# Patient Record
Sex: MALE | Race: WHITE | Employment: FULL TIME | URBAN - METROPOLITAN AREA
[De-identification: names, ages, dates, MRNs, and addresses within clinical notes are randomized per-mention and may not be internally consistent; named-entity substitution may affect disease eponyms.]

---

## 2019-01-12 ENCOUNTER — APPOINTMENT (OUTPATIENT)
Dept: GENERAL RADIOLOGY | Age: 69
DRG: 292 | End: 2019-01-12
Attending: STUDENT IN AN ORGANIZED HEALTH CARE EDUCATION/TRAINING PROGRAM
Payer: COMMERCIAL

## 2019-01-12 ENCOUNTER — HOSPITAL ENCOUNTER (INPATIENT)
Age: 69
LOS: 2 days | Discharge: HOME | DRG: 292 | End: 2019-01-14
Attending: STUDENT IN AN ORGANIZED HEALTH CARE EDUCATION/TRAINING PROGRAM | Admitting: HOSPITALIST
Payer: COMMERCIAL

## 2019-01-12 DIAGNOSIS — I48.91 ATRIAL FIBRILLATION WITH SLOW VENTRICULAR RESPONSE (HCC): Primary | ICD-10-CM

## 2019-01-12 DIAGNOSIS — I50.9 ACUTE ON CHRONIC CONGESTIVE HEART FAILURE, UNSPECIFIED HEART FAILURE TYPE (HCC): ICD-10-CM

## 2019-01-12 LAB
ALBUMIN SERPL-MCNC: 3.5 G/DL (ref 3.5–5)
ALBUMIN/GLOB SERPL: 0.9 {RATIO} (ref 1.1–2.2)
ALP SERPL-CCNC: 62 U/L (ref 45–117)
ALT SERPL-CCNC: 30 U/L (ref 12–78)
ANION GAP SERPL CALC-SCNC: 8 MMOL/L (ref 5–15)
AST SERPL-CCNC: 23 U/L (ref 15–37)
BASOPHILS # BLD: 0 K/UL (ref 0–0.1)
BASOPHILS NFR BLD: 1 % (ref 0–1)
BILIRUB SERPL-MCNC: 0.7 MG/DL (ref 0.2–1)
BNP SERPL-MCNC: 1189 PG/ML (ref 0–125)
BUN SERPL-MCNC: 23 MG/DL (ref 6–20)
BUN/CREAT SERPL: 18 (ref 12–20)
CALCIUM SERPL-MCNC: 8.5 MG/DL (ref 8.5–10.1)
CHLORIDE SERPL-SCNC: 99 MMOL/L (ref 97–108)
CO2 SERPL-SCNC: 25 MMOL/L (ref 21–32)
COMMENT, HOLDF: NORMAL
CREAT SERPL-MCNC: 1.26 MG/DL (ref 0.7–1.3)
DIFFERENTIAL METHOD BLD: ABNORMAL
EOSINOPHIL # BLD: 0.1 K/UL (ref 0–0.4)
EOSINOPHIL NFR BLD: 4 % (ref 0–7)
ERYTHROCYTE [DISTWIDTH] IN BLOOD BY AUTOMATED COUNT: 13.8 % (ref 11.5–14.5)
GLOBULIN SER CALC-MCNC: 4.1 G/DL (ref 2–4)
GLUCOSE SERPL-MCNC: 100 MG/DL (ref 65–100)
HCT VFR BLD AUTO: 34.4 % (ref 36.6–50.3)
HGB BLD-MCNC: 11.9 G/DL (ref 12.1–17)
IMM GRANULOCYTES # BLD AUTO: 0 K/UL (ref 0–0.04)
IMM GRANULOCYTES NFR BLD AUTO: 1 % (ref 0–0.5)
LYMPHOCYTES # BLD: 0.8 K/UL (ref 0.8–3.5)
LYMPHOCYTES NFR BLD: 38 % (ref 12–49)
MAGNESIUM SERPL-MCNC: 1.9 MG/DL (ref 1.6–2.4)
MCH RBC QN AUTO: 34.4 PG (ref 26–34)
MCHC RBC AUTO-ENTMCNC: 34.6 G/DL (ref 30–36.5)
MCV RBC AUTO: 99.4 FL (ref 80–99)
MONOCYTES # BLD: 0.5 K/UL (ref 0–1)
MONOCYTES NFR BLD: 23 % (ref 5–13)
NEUTS SEG # BLD: 0.7 K/UL (ref 1.8–8)
NEUTS SEG NFR BLD: 33 % (ref 32–75)
NRBC # BLD: 0 K/UL (ref 0–0.01)
NRBC BLD-RTO: 0 PER 100 WBC
PATH REV BLD -IMP: ABNORMAL
PLATELET # BLD AUTO: 95 K/UL (ref 150–400)
PMV BLD AUTO: 9.1 FL (ref 8.9–12.9)
POTASSIUM SERPL-SCNC: 3.8 MMOL/L (ref 3.5–5.1)
PROT SERPL-MCNC: 7.6 G/DL (ref 6.4–8.2)
RBC # BLD AUTO: 3.46 M/UL (ref 4.1–5.7)
RBC MORPH BLD: ABNORMAL
SAMPLES BEING HELD,HOLD: NORMAL
SODIUM SERPL-SCNC: 132 MMOL/L (ref 136–145)
TROPONIN I SERPL-MCNC: <0.05 NG/ML
WBC # BLD AUTO: 2.1 K/UL (ref 4.1–11.1)

## 2019-01-12 PROCEDURE — 80053 COMPREHEN METABOLIC PANEL: CPT

## 2019-01-12 PROCEDURE — 83880 ASSAY OF NATRIURETIC PEPTIDE: CPT

## 2019-01-12 PROCEDURE — 99285 EMERGENCY DEPT VISIT HI MDM: CPT

## 2019-01-12 PROCEDURE — 96374 THER/PROPH/DIAG INJ IV PUSH: CPT

## 2019-01-12 PROCEDURE — 36415 COLL VENOUS BLD VENIPUNCTURE: CPT

## 2019-01-12 PROCEDURE — 74011250636 HC RX REV CODE- 250/636: Performed by: STUDENT IN AN ORGANIZED HEALTH CARE EDUCATION/TRAINING PROGRAM

## 2019-01-12 PROCEDURE — 84484 ASSAY OF TROPONIN QUANT: CPT

## 2019-01-12 PROCEDURE — 83735 ASSAY OF MAGNESIUM: CPT

## 2019-01-12 PROCEDURE — 74011250637 HC RX REV CODE- 250/637: Performed by: STUDENT IN AN ORGANIZED HEALTH CARE EDUCATION/TRAINING PROGRAM

## 2019-01-12 PROCEDURE — 93005 ELECTROCARDIOGRAM TRACING: CPT

## 2019-01-12 PROCEDURE — 85025 COMPLETE CBC W/AUTO DIFF WBC: CPT

## 2019-01-12 PROCEDURE — 65660000000 HC RM CCU STEPDOWN

## 2019-01-12 PROCEDURE — 71045 X-RAY EXAM CHEST 1 VIEW: CPT

## 2019-01-12 RX ORDER — POTASSIUM CHLORIDE 750 MG/1
40 TABLET, FILM COATED, EXTENDED RELEASE ORAL
Status: COMPLETED | OUTPATIENT
Start: 2019-01-12 | End: 2019-01-12

## 2019-01-12 RX ORDER — FUROSEMIDE 10 MG/ML
80 INJECTION INTRAMUSCULAR; INTRAVENOUS
Status: COMPLETED | OUTPATIENT
Start: 2019-01-12 | End: 2019-01-12

## 2019-01-12 RX ORDER — AMIODARONE HYDROCHLORIDE 200 MG/1
200 TABLET ORAL DAILY
COMMUNITY
End: 2019-01-14

## 2019-01-12 RX ORDER — LOSARTAN POTASSIUM 100 MG/1
100 TABLET ORAL DAILY
COMMUNITY

## 2019-01-12 RX ORDER — AMLODIPINE BESYLATE 5 MG/1
5 TABLET ORAL DAILY
Status: DISCONTINUED | OUTPATIENT
Start: 2019-01-13 | End: 2019-01-14 | Stop reason: HOSPADM

## 2019-01-12 RX ORDER — ACETAMINOPHEN 325 MG/1
650 TABLET ORAL
Status: DISCONTINUED | OUTPATIENT
Start: 2019-01-12 | End: 2019-01-14 | Stop reason: HOSPADM

## 2019-01-12 RX ORDER — HYDROCHLOROTHIAZIDE 25 MG/1
25 TABLET ORAL DAILY
COMMUNITY
End: 2019-01-14

## 2019-01-12 RX ORDER — FUROSEMIDE 10 MG/ML
40 INJECTION INTRAMUSCULAR; INTRAVENOUS EVERY 12 HOURS
Status: DISCONTINUED | OUTPATIENT
Start: 2019-01-13 | End: 2019-01-13

## 2019-01-12 RX ORDER — CARVEDILOL 25 MG/1
25 TABLET ORAL 2 TIMES DAILY WITH MEALS
COMMUNITY
End: 2019-01-14

## 2019-01-12 RX ORDER — POTASSIUM CHLORIDE 750 MG/1
10 TABLET, FILM COATED, EXTENDED RELEASE ORAL DAILY
COMMUNITY

## 2019-01-12 RX ORDER — AMLODIPINE BESYLATE 5 MG/1
5 TABLET ORAL DAILY
COMMUNITY

## 2019-01-12 RX ORDER — ONDANSETRON 2 MG/ML
4 INJECTION INTRAMUSCULAR; INTRAVENOUS
Status: DISCONTINUED | OUTPATIENT
Start: 2019-01-12 | End: 2019-01-14 | Stop reason: HOSPADM

## 2019-01-12 RX ORDER — LOSARTAN POTASSIUM 50 MG/1
100 TABLET ORAL DAILY
Status: DISCONTINUED | OUTPATIENT
Start: 2019-01-13 | End: 2019-01-14 | Stop reason: HOSPADM

## 2019-01-12 RX ORDER — AMIODARONE HYDROCHLORIDE 200 MG/1
200 TABLET ORAL DAILY
Status: DISCONTINUED | OUTPATIENT
Start: 2019-01-13 | End: 2019-01-13

## 2019-01-12 RX ADMIN — POTASSIUM CHLORIDE 40 MEQ: 750 TABLET, EXTENDED RELEASE ORAL at 13:12

## 2019-01-12 RX ADMIN — FUROSEMIDE 80 MG: 10 INJECTION, SOLUTION INTRAMUSCULAR; INTRAVENOUS at 13:12

## 2019-01-12 NOTE — ED TRIAGE NOTES
Patient arrives feeling short of breath and \"winded\" over the last month. History of a-fib/flutter. Denies nausea/vomiting/abdominal pain and diarrhea

## 2019-01-12 NOTE — ROUTINE PROCESS
TRANSFER - OUT REPORT: 
 
Verbal report given to SANDY Albarran(name) on Supa Navarro  being transferred to [facility]) for routine progression of care Report consisted of patients Situation, Background, Assessment and  
Recommendations(SBAR). Information from the following report(s) SBAR, Kardex, Intake/Output, MAR and Recent Results was reviewed with the receiving nurse. Lines:  
Peripheral IV 01/12/19 Right Wrist (Active) Site Assessment Clean, dry, & intact 1/12/2019  2:57 PM  
Phlebitis Assessment 0 1/12/2019  2:57 PM  
Infiltration Assessment 0 1/12/2019  2:57 PM  
Dressing Status Clean, dry, & intact 1/12/2019  2:57 PM  
Hub Color/Line Status Pink;Capped;Flushed;Patent 1/12/2019  2:57 PM  
  
 
Opportunity for questions and clarification was provided. Patient transported with: 
 Monitor

## 2019-01-12 NOTE — ED PROVIDER NOTES
70 y.o. male with past medical history significant for hypertension and a-fib who presents ambulatory from home with chief complaint of shortness of breath. Patient reports onset of shortness of breath a few months ago that got worse over the past 2 weeks. He notes CHAVIRA and reports history of similar symptoms with a-fib. He reports 3 previous episodes of a-fib when he was cardioverted. Patient notes leg swelling and bloating. Of note, patient takes Losartan, Amiodarone, Hydrochlorothiazide, Carvedilol, Amlodipine, and potassium. Patient reports that he is from [place] and was evaluated by his family doctor there 1 year ago. Pertinent negatives include heart fluttering, chest pain, chest discomfort, fever, and increased cough. There are no other acute medical concerns at this time. Note written by Melchor Ventura, as dictated by Katja Dougherty MD 11:45 AM 
 
 
 
 
 
 
  
 
No past medical history on file. No past surgical history on file. No family history on file. Social History Socioeconomic History  Marital status: Not on file Spouse name: Not on file  Number of children: Not on file  Years of education: Not on file  Highest education level: Not on file Social Needs  Financial resource strain: Not on file  Food insecurity - worry: Not on file  Food insecurity - inability: Not on file  Transportation needs - medical: Not on file  Transportation needs - non-medical: Not on file Occupational History  Not on file Tobacco Use  Smoking status: Not on file Substance and Sexual Activity  Alcohol use: Not on file  Drug use: Not on file  Sexual activity: Not on file Other Topics Concern  Not on file Social History Narrative  Not on file ALLERGIES: Patient has no allergy information on record. Review of Systems Constitutional: Negative for chills and fever. HENT: Negative for sore throat. Eyes: Negative for pain. Respiratory: Positive for shortness of breath. Cardiovascular: Positive for leg swelling. Negative for chest pain. Gastrointestinal: Negative for abdominal pain and vomiting. Genitourinary: Negative for dysuria. Skin: Negative for rash. Neurological: Negative for syncope and headaches. Psychiatric/Behavioral: Negative for confusion. All other systems reviewed and are negative. Vitals:  
 01/12/19 1126 Pulse: (!) 57 SpO2: 99% Physical Exam  
Constitutional: He is oriented to person, place, and time. He appears well-developed. No distress. Obese HENT:  
Head: Normocephalic and atraumatic. Eyes: Conjunctivae and EOM are normal.  
Neck: Normal range of motion. Neck supple. Cardiovascular: Normal heart sounds. An irregularly irregular rhythm present. Bradycardia present. No murmur heard. Pulmonary/Chest: Effort normal and breath sounds normal. No respiratory distress. Abdominal: Soft. Bowel sounds are normal. He exhibits no distension. There is no tenderness. There is no rebound. Musculoskeletal: Normal range of motion. He exhibits no tenderness. Right lower leg: He exhibits edema (3+ pitting). Left lower leg: He exhibits edema (3+ pitting). Neurological: He is alert and oriented to person, place, and time. No cranial nerve deficit. He exhibits normal muscle tone. Coordination normal.  
Skin: Skin is warm and dry. Nursing note and vitals reviewed. Note written by Melchor Frias, as dictated by Leticia Ponce MD 11:45 AM 
 
 
MDM Procedures ED EKG interpretation: 
Rhythm: atrial fib; Rate (approx.): 50; Axis: left axis deviation; No STEMI. Note written by Melchor Frias, as dictated by Leticia Ponce MD 11:45 AM 
 
I admitted the patient the hospitalist via [facility] Text. I also consulted Dr. Mary Delgadillo, cardiology on-call, who will see the pt in consultation.

## 2019-01-12 NOTE — PROGRESS NOTES
Admission Medication Reconciliation: 
 
Information obtained from: Patient Significant PMH/Disease States:  
Past Medical History:  
Diagnosis Date  Asthma  Atrial fibrillation and flutter ([address]  Hypertension Chief Complaint for this Admission:  SOB Allergies:  Patient has no known allergies. Prior to Admission Medications:  
Prior to Admission Medications Prescriptions Last Dose Informant Patient Reported? Taking? amLODIPine (NORVASC) 5 mg tablet 1/12/2019 at Unknown time  Yes Yes Sig: Take 5 mg by mouth daily. amiodarone (CORDARONE) 200 mg tablet 1/12/2019 at Unknown time  Yes Yes Sig: Take 200 mg by mouth daily. carvedilol (COREG) 25 mg tablet 1/12/2019 at Unknown time  Yes Yes Sig: Take 25 mg by mouth two (2) times daily (with meals). hydroCHLOROthiazide (HYDRODIURIL) 25 mg tablet 1/12/2019 at Unknown time  Yes Yes Sig: Take 25 mg by mouth daily. losartan (COZAAR) 100 mg tablet 1/12/2019 at Unknown time  Yes Yes Sig: Take 100 mg by mouth daily. potassium chloride SR (KLOR-CON 10) 10 mEq tablet 1/12/2019 at Unknown time  Yes Yes Sig: Take 10 mEq by mouth daily. rivaroxaban (XARELTO) 20 mg tab tablet 1/12/2019 at Unknown time  Yes Yes Sig: Take 20 mg by mouth daily (with breakfast). Facility-Administered Medications: None Comments/Recommendations: Patient provided medication list and updated administration times. Added all agents. Thank you for allowing me to participate in the care of your patient. Tiffanie De La Torre PharmD, RN #[id number]

## 2019-01-12 NOTE — CONSULTS
CARDIOLOGY CONSULT                  Subjective:    Date of  Admission: 1/12/2019 11:35 AM     Admission type:Emergency    Ambrosio Thomas is a 70 y.o. male admitted for CHF exacerbation ([facility] Lives in [place] and follows with a cardiologist there. He has three episodes previously of CHF in the setting of AF. He has required electrical cardioversions in the past and has been started on amiodarone fairly recently. He is here on business and has noted gradually progressive dyspnea and edema. He has been compliant with his meds and confirmed complete compliance with Xarelto. ED workup showed elevated BNP and fluid on CXR. Received IV lasix with good diuresis. Has been consistently bradycardic. Patient Active Problem List    Diagnosis Date Noted    CHF exacerbation ([facility] 01/12/2019      Other, MD Ash  Past Medical History:   Diagnosis Date    Asthma     Atrial fibrillation and flutter ([facility]     Hypertension       History reviewed. No pertinent surgical history. No Known Allergies   History reviewed. No pertinent family history. No current facility-administered medications for this encounter. Current Outpatient Medications   Medication Sig    amiodarone (CORDARONE) 200 mg tablet Take 200 mg by mouth daily.  amLODIPine (NORVASC) 5 mg tablet Take 5 mg by mouth daily.  carvedilol (COREG) 25 mg tablet Take 25 mg by mouth two (2) times daily (with meals).  hydroCHLOROthiazide (HYDRODIURIL) 25 mg tablet Take 25 mg by mouth daily.  losartan (COZAAR) 100 mg tablet Take 100 mg by mouth daily.  potassium chloride SR (KLOR-CON 10) 10 mEq tablet Take 10 mEq by mouth daily.  rivaroxaban (XARELTO) 20 mg tab tablet Take 20 mg by mouth daily (with breakfast).          Review of Symptoms:  Gen - no F/C/S  Eyes - no vision changes  ENT - no sore throat, rhinorrhea, otalgia  CV - no CP, no palpitations, no orthopnea, no PND, + ITZ  Resp no cough, +SOB/CHAVIRA  GI - no AP, no n/v/d/c   - no dysuria, no hematuria  MSK - no abnormal joint pains  Skin - no rashes  Neuro - no HA, no numbness, no weakness, no slurred speech  Psych - no change in mood         Physical Exam    Visit Vitals  /76   Pulse (!) 46   Temp 98.3 °F (36.8 °C)   Resp 17   Ht 5' 10\" (1.778 m)   Wt 108.9 kg (240 lb)   SpO2 97%   BMI 34.44 kg/m²     NAD  Skin warm and dry  Nl conjunctiva  Oropharynx without exudate. Neck supple  Lungs with basilar crackles  I/I bradycardic rhythm  Abdomen soft and non tender  Pulses 2+ radials  ++ITZ  Neuro:  Grossly intact  Appropriate    Cardiographics    Telemetry: AFIB  ECG: AF with slow response  Echocardiogram: pending    Labs:   Recent Results (from the past 24 hour(s))   EKG, 12 LEAD, INITIAL    Collection Time: 01/12/19 11:31 AM   Result Value Ref Range    Ventricular Rate 50 BPM    Atrial Rate 47 BPM    QRS Duration 96 ms    Q-T Interval 512 ms    QTC Calculation (Bezet) 466 ms    Calculated R Axis -53 degrees    Calculated T Axis 10 degrees    Diagnosis       Atrial fibrillation  Left axis deviation  Inferior infarct , age undetermined  No previous ECGs available     CBC WITH AUTOMATED DIFF    Collection Time: 01/12/19 11:55 AM   Result Value Ref Range    WBC 2.1 (L) 4.1 - 11.1 K/uL    RBC 3.46 (L) 4.10 - 5.70 M/uL    HGB 11.9 (L) 12.1 - 17.0 g/dL    HCT 34.4 (L) 36.6 - 50.3 %    MCV 99.4 (H) 80.0 - 99.0 FL    MCH 34.4 (H) 26.0 - 34.0 PG    MCHC 34.6 30.0 - 36.5 g/dL    RDW 13.8 11.5 - 14.5 %    PLATELET 95 (L) 000 - 400 K/uL    MPV 9.1 8.9 - 12.9 FL    NRBC 0.0 0  WBC    ABSOLUTE NRBC 0.00 0.00 - 0.01 K/uL    NEUTROPHILS 33 32 - 75 %    LYMPHOCYTES 38 12 - 49 %    MONOCYTES 23 (H) 5 - 13 %    EOSINOPHILS 4 0 - 7 %    BASOPHILS 1 0 - 1 %    IMMATURE GRANULOCYTES 1 (H) 0.0 - 0.5 %    ABS. NEUTROPHILS 0.7 (L) 1.8 - 8.0 K/UL    ABS. LYMPHOCYTES 0.8 0.8 - 3.5 K/UL    ABS. MONOCYTES 0.5 0.0 - 1.0 K/UL    ABS. EOSINOPHILS 0.1 0.0 - 0.4 K/UL    ABS. BASOPHILS 0.0 0.0 - 0.1 K/UL    ABS. IMM. GRANS. 0.0 0.00 - 0.04 K/UL    DF SMEAR SCANNED      RBC COMMENTS NORMOCYTIC, NORMOCHROMIC      Pathologist review Pathology Review Requested     METABOLIC PANEL, COMPREHENSIVE    Collection Time: 01/12/19 11:55 AM   Result Value Ref Range    Sodium 132 (L) 136 - 145 mmol/L    Potassium 3.8 3.5 - 5.1 mmol/L    Chloride 99 97 - 108 mmol/L    CO2 25 21 - 32 mmol/L    Anion gap 8 5 - 15 mmol/L    Glucose 100 65 - 100 mg/dL    BUN 23 (H) 6 - 20 MG/DL    Creatinine 1.26 0.70 - 1.30 MG/DL    BUN/Creatinine ratio 18 12 - 20      GFR est AA >60 >60 ml/min/1.73m2    GFR est non-AA 57 (L) >60 ml/min/1.73m2    Calcium 8.5 8.5 - 10.1 MG/DL    Bilirubin, total 0.7 0.2 - 1.0 MG/DL    ALT (SGPT) 30 12 - 78 U/L    AST (SGOT) 23 15 - 37 U/L    Alk. phosphatase 62 45 - 117 U/L    Protein, total 7.6 6.4 - 8.2 g/dL    Albumin 3.5 3.5 - 5.0 g/dL    Globulin 4.1 (H) 2.0 - 4.0 g/dL    A-G Ratio 0.9 (L) 1.1 - 2.2     NT-PRO BNP    Collection Time: 01/12/19 11:55 AM   Result Value Ref Range    NT pro-BNP 1,189 (H) 0 - 125 PG/ML   MAGNESIUM    Collection Time: 01/12/19 11:55 AM   Result Value Ref Range    Magnesium 1.9 1.6 - 2.4 mg/dL   TROPONIN I    Collection Time: 01/12/19 11:55 AM   Result Value Ref Range    Troponin-I, Qt. <0.05 <0.05 ng/mL   SAMPLES BEING HELD    Collection Time: 01/12/19 11:56 AM   Result Value Ref Range    SAMPLES BEING HELD 1RED,1BLUE     COMMENT        Add-on orders for these samples will be processed based on acceptable specimen integrity and analyte stability, which may vary by analyte. Assessment and Plan:     This is a 70 yom with PAF and acute on chronic likely diastolic CHF, NYHA3.,    Agree with continue diuresis  BMP in AM  Echo  Would hold amiodarone, HCTZ and BB  Cont other cardiac meds  Will likely need cardioversion with anesthesia on 1/14  Will defer ischemic evaluation if normal LVEF on echo  No need to check further troponins  Will try to obtain records from home cardiologist.    Thank you for this consult, please call with questions     Assessment:

## 2019-01-13 LAB
ALBUMIN SERPL-MCNC: 3.2 G/DL (ref 3.5–5)
ALBUMIN/GLOB SERPL: 0.8 {RATIO} (ref 1.1–2.2)
ALP SERPL-CCNC: 59 U/L (ref 45–117)
ALT SERPL-CCNC: 27 U/L (ref 12–78)
ANION GAP SERPL CALC-SCNC: 9 MMOL/L (ref 5–15)
AST SERPL-CCNC: 23 U/L (ref 15–37)
ATRIAL RATE: 47 BPM
BILIRUB SERPL-MCNC: 0.7 MG/DL (ref 0.2–1)
BUN SERPL-MCNC: 27 MG/DL (ref 6–20)
BUN/CREAT SERPL: 20 (ref 12–20)
CALCIUM SERPL-MCNC: 8.6 MG/DL (ref 8.5–10.1)
CALCULATED R AXIS, ECG10: -53 DEGREES
CALCULATED T AXIS, ECG11: 10 DEGREES
CHLORIDE SERPL-SCNC: 99 MMOL/L (ref 97–108)
CHOLEST SERPL-MCNC: 109 MG/DL
CO2 SERPL-SCNC: 25 MMOL/L (ref 21–32)
CREAT SERPL-MCNC: 1.36 MG/DL (ref 0.7–1.3)
DIAGNOSIS, 93000: NORMAL
ERYTHROCYTE [DISTWIDTH] IN BLOOD BY AUTOMATED COUNT: 13.6 % (ref 11.5–14.5)
GLOBULIN SER CALC-MCNC: 4.1 G/DL (ref 2–4)
GLUCOSE SERPL-MCNC: 92 MG/DL (ref 65–100)
HCT VFR BLD AUTO: 33.3 % (ref 36.6–50.3)
HDLC SERPL-MCNC: 36 MG/DL
HDLC SERPL: 3 {RATIO} (ref 0–5)
HGB BLD-MCNC: 11.6 G/DL (ref 12.1–17)
LDLC SERPL CALC-MCNC: 54.6 MG/DL (ref 0–100)
LIPID PROFILE,FLP: NORMAL
MAGNESIUM SERPL-MCNC: 1.9 MG/DL (ref 1.6–2.4)
MCH RBC QN AUTO: 34.5 PG (ref 26–34)
MCHC RBC AUTO-ENTMCNC: 34.8 G/DL (ref 30–36.5)
MCV RBC AUTO: 99.1 FL (ref 80–99)
NRBC # BLD: 0 K/UL (ref 0–0.01)
NRBC BLD-RTO: 0 PER 100 WBC
PHOSPHATE SERPL-MCNC: 3.8 MG/DL (ref 2.6–4.7)
PLATELET # BLD AUTO: 97 K/UL (ref 150–400)
PMV BLD AUTO: 9.3 FL (ref 8.9–12.9)
POTASSIUM SERPL-SCNC: 3.7 MMOL/L (ref 3.5–5.1)
PROT SERPL-MCNC: 7.3 G/DL (ref 6.4–8.2)
Q-T INTERVAL, ECG07: 512 MS
QRS DURATION, ECG06: 96 MS
QTC CALCULATION (BEZET), ECG08: 466 MS
RBC # BLD AUTO: 3.36 M/UL (ref 4.1–5.7)
SODIUM SERPL-SCNC: 133 MMOL/L (ref 136–145)
TRIGL SERPL-MCNC: 92 MG/DL (ref ?–150)
TSH SERPL DL<=0.05 MIU/L-ACNC: 4.14 UIU/ML (ref 0.36–3.74)
VENTRICULAR RATE, ECG03: 50 BPM
VLDLC SERPL CALC-MCNC: 18.4 MG/DL
WBC # BLD AUTO: 3.1 K/UL (ref 4.1–11.1)

## 2019-01-13 PROCEDURE — 83735 ASSAY OF MAGNESIUM: CPT

## 2019-01-13 PROCEDURE — 74011250636 HC RX REV CODE- 250/636: Performed by: HOSPITALIST

## 2019-01-13 PROCEDURE — 80053 COMPREHEN METABOLIC PANEL: CPT

## 2019-01-13 PROCEDURE — 65660000000 HC RM CCU STEPDOWN

## 2019-01-13 PROCEDURE — 84443 ASSAY THYROID STIM HORMONE: CPT

## 2019-01-13 PROCEDURE — 85027 COMPLETE CBC AUTOMATED: CPT

## 2019-01-13 PROCEDURE — 36415 COLL VENOUS BLD VENIPUNCTURE: CPT

## 2019-01-13 PROCEDURE — 84100 ASSAY OF PHOSPHORUS: CPT

## 2019-01-13 PROCEDURE — 74011250637 HC RX REV CODE- 250/637: Performed by: HOSPITALIST

## 2019-01-13 PROCEDURE — 93306 TTE W/DOPPLER COMPLETE: CPT

## 2019-01-13 PROCEDURE — 80061 LIPID PANEL: CPT

## 2019-01-13 RX ORDER — FOLIC ACID 1 MG/1
1 TABLET ORAL DAILY
Status: DISCONTINUED | OUTPATIENT
Start: 2019-01-14 | End: 2019-01-14 | Stop reason: HOSPADM

## 2019-01-13 RX ORDER — LANOLIN ALCOHOL/MO/W.PET/CERES
100 CREAM (GRAM) TOPICAL DAILY
Status: DISCONTINUED | OUTPATIENT
Start: 2019-01-14 | End: 2019-01-14 | Stop reason: HOSPADM

## 2019-01-13 RX ORDER — FUROSEMIDE 10 MG/ML
40 INJECTION INTRAMUSCULAR; INTRAVENOUS DAILY
Status: DISCONTINUED | OUTPATIENT
Start: 2019-01-14 | End: 2019-01-14

## 2019-01-13 RX ORDER — LORAZEPAM 2 MG/1
2 TABLET ORAL
Status: DISCONTINUED | OUTPATIENT
Start: 2019-01-13 | End: 2019-01-14 | Stop reason: HOSPADM

## 2019-01-13 RX ADMIN — FUROSEMIDE 40 MG: 10 INJECTION, SOLUTION INTRAMUSCULAR; INTRAVENOUS at 10:00

## 2019-01-13 RX ADMIN — RIVAROXABAN 20 MG: 20 TABLET, FILM COATED ORAL at 10:00

## 2019-01-13 RX ADMIN — ACETAMINOPHEN 650 MG: 325 TABLET ORAL at 22:57

## 2019-01-13 RX ADMIN — AMLODIPINE BESYLATE 5 MG: 5 TABLET ORAL at 10:00

## 2019-01-13 RX ADMIN — LOSARTAN POTASSIUM 100 MG: 50 TABLET ORAL at 10:00

## 2019-01-13 NOTE — ROUTINE PROCESS
Bedside and Verbal shift change report given to Pablo Urena RN (oncoming nurse) by Shon Bueno RN (offgoing nurse). Report included the following information SBAR, Kardex, Intake/Output, MAR, Recent Results and Cardiac Rhythm a fib/aflutter.

## 2019-01-13 NOTE — PROGRESS NOTES
Hospitalist Progress Note Shahriar Sher MD 
Answering service: [phone] OR [phone] from in house phone Date of Service:  2019 NAME:  Vanita Thomas :  1950 MRN:  [id number] PCP: Vincent, MD Ash 
 
Chief Complaint:  
Chief Complaint Patient presents with  Palpitations  Shortness of Breath Admission Summary:  
 
Vanita Thomas is a 70 y.o. male who presented with SOB Interval history / Subjective:  
Patient seen for Follow up of  CHF/Afib First encounter Patient seen and examined by the bedside Labs, images and notes reviewed Patient is feeling much better, no chest pain, no SOB, abdominal pain Cont to have slow afib, no palpitation, no dizziness No fevers or chills No nausea or vomiting Discussed with nursing staff, no acute issues overnight, orders reviewed. Assessment & Plan:  
 
- CHF exacerbation: no previous record, ? Systolic HF. Cont lasix daily Echo done, pending report 
continue losartan. Hold BB and amiodarone now due to significant bradycardia. Cardiologist consult noted - Chronic afib with bradycardia. HR around 45, hold BB now. Continue xarelto Cards plan is DCCV on  
 
- HTN: hold hctz, will monitor, continue ARBS and norvasc. Mild bump in creatinine Repeat labs in am 
 
- Obese: BMI 34. Weight loss recommended - Leukopenia: , etiology unknown, possibly Due to prolong alcohol abuse. Monitor cbc - Alcohol abuse: daily beer, watch for withdrawal  
CIWA protocol MVI, folic acid, thiamine - Mild hyponatremia: may due to HCTZ and beer - Mild elevation of TSH No clinically significant Repeat TSH after acute issues resolved, likely as outpatient Code status: Full Code DVT prophylaxis: Xarelto Care Plan discussed with: Patient/Family and Nurse Disposition: TBD Hospital Problems  Never Reviewed Codes Class Noted POA * (Principal) CHF exacerbation ([facility] ICD-10-CM: I50.9 ICD-9-CM: 428.0  2019 Yes Review of Systems: A comprehensive review of systems was negative except for that written in the HPI. Physical Examination:  
 
  General appearance: alert, no distress, appears stated age Head: Normocephalic, without obvious abnormality, atraumatic Eyes: negative findings: anicteric sclera Lungs: clear to auscultation bilaterally Heart: irregularly irregular rhythm Abdomen: soft, NT, NG, NR Extremities: 2+ pitting edema BL Skin: Skin color, texture, turgor normal. No rashes or lesions Neurologic: Grossly normal 
  
 
Vital Signs:  
 Last 24hrs VS reviewed since prior progress note. Most recent are: 
 
Visit Vitals BP 90/62 (BP 1 Location: Left arm, BP Patient Position: At rest) Pulse (!) 51 Temp 98.7 °F (37.1 °C) Resp 18 Ht 5' 10\" (1.778 m) Wt 111.3 kg (245 lb 4.8 oz) SpO2 100% BMI 35.20 kg/m² Intake/Output Summary (Last 24 hours) at 2019 1304 Last data filed at 2019 9098 Gross per 24 hour Intake 840 ml Output 2800 ml Net -1960 ml Tmax:  Temp (24hrs), Av.4 °F (36.9 °C), Min:98 °F (36.7 °C), Max:98.8 °F (37.1 °C) Data Review: Xr Chest [facility] Result Date: 2019 Chest portable AP History: Dyspnea on exertion Comparison: None Findings: The lungs are well expanded. No focal consolidation, pleural effusion, or pneumothorax. The heart is moderately enlarged. There is mild edema. Degenerative changes are seen in the shoulders. Impression: Moderate cardiomegaly with mild edema No results found for: SDES No results found for: CULT All Micro Results None Labs:  
 
Recent Labs  
  19 
0256 19 
1155 WBC 3.1* 2.1* HGB 11.6* 11.9*  
HCT 33.3* 34.4*  
PLT 97* 95* Recent Labs  
  19 
0256 19 
1155 * 132* K 3.7 3.8 CL 99 99 CO2 25 25 BUN 27* 23* CREA 1.36* 1.26  
GLU 92 100 CA 8.6 8.5 MG 1.9 1.9 PHOS 3.8  --   
 
 Recent Labs  
  01/13/19 
0256 01/12/19 
1155 SGOT 23 23 ALT 27 30 AP 59 62 TBILI 0.7 0.7 TP 7.3 7.6 ALB 3.2* 3.5 GLOB 4.1* 4.1* No results for input(s): INR, PTP, APTT in the last 72 hours. No lab exists for component: INREXT No results for input(s): FE, TIBC, PSAT, FERR in the last 72 hours. No results found for: FOL, RBCF No results for input(s): PH, PCO2, PO2 in the last 72 hours. Recent Labs  
  01/12/19 
1155 TROIQ <0.05 Lab Results Component Value Date/Time Cholesterol, total 109 01/13/2019 02:56 AM  
 HDL Cholesterol 36 01/13/2019 02:56 AM  
 LDL, calculated 54.6 01/13/2019 02:56 AM  
 Triglyceride 92 01/13/2019 02:56 AM  
 CHOL/HDL Ratio 3.0 01/13/2019 02:56 AM  
 
No results found for: Jerilee Genera No results found for: COLOR, APPRN, SPGRU, REFSG, HERMAN, PROTU, GLUCU, KETU, BILU, UROU, NAUN, LEUKU, GLUKE, EPSU, BACTU, WBCU, RBCU, CASTS, UCRY Medications Reviewed:  
 
Current Facility-Administered Medications Medication Dose Route Frequency  [START ON 1/14/2019] furosemide (LASIX) injection 40 mg  40 mg IntraVENous DAILY  losartan (COZAAR) tablet 100 mg  100 mg Oral DAILY  ondansetron (ZOFRAN) injection 4 mg  4 mg IntraVENous Q6H PRN  
 acetaminophen (TYLENOL) tablet 650 mg  650 mg Oral Q6H PRN  
 amLODIPine (NORVASC) tablet 5 mg  5 mg Oral DAILY  rivaroxaban (XARELTO) tablet 20 mg  20 mg Oral DAILY WITH BREAKFAST  
 
______________________________________________________________________ EXPECTED LENGTH OF STAY: 3d 7h 
ACTUAL LENGTH OF STAY:          1 Shamika Bhatti MD

## 2019-01-13 NOTE — PROGRESS NOTES
Cardiology Progress Note 2019     Admit Date: 2019 Admit Diagnosis: [facility]F exacerbation ([facility]  CC: none currently Assessment:  
Principal Problem: CHF exacerbation ([facility] (2019) Plan:  
 
Decreased lasix to daily dosing given increase in Cr Holding amiodarone given bradycardia May restart low dose carvedilol depending on HR/BP Daily lytes NPO at [facility] for DCCV Subjective:   
 
Luisito Huang feels better. Still not 100% Objective:  
 Physical Exam: 
Overall VSSAF;   
Visit Vitals /75 (BP 1 Location: Left arm, BP Patient Position: At rest) Pulse (!) 52 Temp 98.8 °F (37.1 °C) Resp 18 Ht 5' 10\" (1.778 m) Wt 111.3 kg (245 lb 4.8 oz) SpO2 100% BMI 35.20 kg/m² Temp (24hrs), Av.3 °F (36.8 °C), Min:98 °F (36.7 °C), Max:98.8 °F (37.1 °C) Patient Vitals for the past 8 hrs: 
 Pulse 19 0759 (!) 52  
19 0308 (!) 59 Patient Vitals for the past 8 hrs: 
 Resp  
19 0759 18  
19 0308 18 Patient Vitals for the past 8 hrs: 
 BP  
19 0759 112/75  
19 0308 110/66  
  
 1901 -  0700 In: 240 [P.O.:240] Out: 2800 [Urine:2800] General Appearance: Well developed, well nourished, no acute distress. Ears/Nose/Mouth/Throat:   Normal MM; anicteric. JVP: WNL Resp:   Improved rales Cardiovascular:  RRR, S1, S2 normal, no new murmur. No gallop or rub. Abdomen:   Soft, non-tender, bowel sounds are present. Extremities: + edema bilaterally. Skin: 
Neuro: Warm and dry. A/O x3, grossly nonfocal  
                     
Data Review:    
Telemetry independently reviewed :   AFIB Labs:  
Recent Results (from the past 24 hour(s)) EKG, 12 LEAD, INITIAL Collection Time: 19 11:31 AM  
Result Value Ref Range Ventricular Rate 50 BPM  
 Atrial Rate 47 BPM  
 QRS Duration 96 ms  
 Q-T Interval 512 ms QTC Calculation (Bezet) 466 ms Calculated R Axis -53 degrees Calculated T Axis 10 degrees Diagnosis Atrial fibrillation Left axis deviation Inferior infarct , age undetermined No previous ECGs available CBC WITH AUTOMATED DIFF Collection Time: 01/12/19 11:55 AM  
Result Value Ref Range WBC 2.1 (L) 4.1 - 11.1 K/uL  
 RBC 3.46 (L) 4.10 - 5.70 M/uL  
 HGB 11.9 (L) 12.1 - 17.0 g/dL HCT 34.4 (L) 36.6 - 50.3 % MCV 99.4 (H) 80.0 - 99.0 FL  
 MCH 34.4 (H) 26.0 - 34.0 PG  
 MCHC 34.6 30.0 - 36.5 g/dL  
 RDW 13.8 11.5 - 14.5 % PLATELET 95 (L) 583 - 400 K/uL MPV 9.1 8.9 - 12.9 FL  
 NRBC 0.0 0  WBC ABSOLUTE NRBC 0.00 0.00 - 0.01 K/uL NEUTROPHILS 33 32 - 75 % LYMPHOCYTES 38 12 - 49 % MONOCYTES 23 (H) 5 - 13 % EOSINOPHILS 4 0 - 7 % BASOPHILS 1 0 - 1 % IMMATURE GRANULOCYTES 1 (H) 0.0 - 0.5 % ABS. NEUTROPHILS 0.7 (L) 1.8 - 8.0 K/UL  
 ABS. LYMPHOCYTES 0.8 0.8 - 3.5 K/UL  
 ABS. MONOCYTES 0.5 0.0 - 1.0 K/UL  
 ABS. EOSINOPHILS 0.1 0.0 - 0.4 K/UL  
 ABS. BASOPHILS 0.0 0.0 - 0.1 K/UL  
 ABS. IMM. GRANS. 0.0 0.00 - 0.04 K/UL  
 DF SMEAR SCANNED    
 RBC COMMENTS NORMOCYTIC, NORMOCHROMIC Pathologist review Pathology Review Requested METABOLIC PANEL, COMPREHENSIVE Collection Time: 01/12/19 11:55 AM  
Result Value Ref Range Sodium 132 (L) 136 - 145 mmol/L Potassium 3.8 3.5 - 5.1 mmol/L Chloride 99 97 - 108 mmol/L  
 CO2 25 21 - 32 mmol/L Anion gap 8 5 - 15 mmol/L Glucose 100 65 - 100 mg/dL BUN 23 (H) 6 - 20 MG/DL Creatinine 1.26 0.70 - 1.30 MG/DL  
 BUN/Creatinine ratio 18 12 - 20 GFR est AA >60 >60 ml/min/1.73m2 GFR est non-AA 57 (L) >60 ml/min/1.73m2 Calcium 8.5 8.5 - 10.1 MG/DL Bilirubin, total 0.7 0.2 - 1.0 MG/DL  
 ALT (SGPT) 30 12 - 78 U/L  
 AST (SGOT) 23 15 - 37 U/L Alk. phosphatase 62 45 - 117 U/L Protein, total 7.6 6.4 - 8.2 g/dL Albumin 3.5 3.5 - 5.0 g/dL Globulin 4.1 (H) 2.0 - 4.0 g/dL A-G Ratio 0.9 (L) 1.1 - 2.2 NT-PRO BNP Collection Time: 01/12/19 11:55 AM  
Result Value Ref Range NT pro-BNP 1,189 (H) 0 - 125 PG/ML  
MAGNESIUM Collection Time: 01/12/19 11:55 AM  
Result Value Ref Range Magnesium 1.9 1.6 - 2.4 mg/dL TROPONIN I Collection Time: 01/12/19 11:55 AM  
Result Value Ref Range Troponin-I, Qt. <0.05 <0.05 ng/mL SAMPLES BEING HELD Collection Time: 01/12/19 11:56 AM  
Result Value Ref Range SAMPLES BEING HELD 1RED,1BLUE   
 COMMENT Add-on orders for these samples will be processed based on acceptable specimen integrity and analyte stability, which may vary by analyte. METABOLIC PANEL, COMPREHENSIVE Collection Time: 01/13/19  2:56 AM  
Result Value Ref Range Sodium 133 (L) 136 - 145 mmol/L Potassium 3.7 3.5 - 5.1 mmol/L Chloride 99 97 - 108 mmol/L  
 CO2 25 21 - 32 mmol/L Anion gap 9 5 - 15 mmol/L Glucose 92 65 - 100 mg/dL BUN 27 (H) 6 - 20 MG/DL Creatinine 1.36 (H) 0.70 - 1.30 MG/DL  
 BUN/Creatinine ratio 20 12 - 20 GFR est AA >60 >60 ml/min/1.73m2 GFR est non-AA 52 (L) >60 ml/min/1.73m2 Calcium 8.6 8.5 - 10.1 MG/DL Bilirubin, total 0.7 0.2 - 1.0 MG/DL  
 ALT (SGPT) 27 12 - 78 U/L  
 AST (SGOT) 23 15 - 37 U/L Alk. phosphatase 59 45 - 117 U/L Protein, total 7.3 6.4 - 8.2 g/dL Albumin 3.2 (L) 3.5 - 5.0 g/dL Globulin 4.1 (H) 2.0 - 4.0 g/dL A-G Ratio 0.8 (L) 1.1 - 2.2 MAGNESIUM Collection Time: 01/13/19  2:56 AM  
Result Value Ref Range Magnesium 1.9 1.6 - 2.4 mg/dL PHOSPHORUS Collection Time: 01/13/19  2:56 AM  
Result Value Ref Range Phosphorus 3.8 2.6 - 4.7 MG/DL  
TSH 3RD GENERATION Collection Time: 01/13/19  2:56 AM  
Result Value Ref Range TSH 4.14 (H) 0.36 - 3.74 uIU/mL  
LIPID PANEL Collection Time: 01/13/19  2:56 AM  
Result Value Ref Range LIPID PROFILE Cholesterol, total 109 <200 MG/DL Triglyceride 92 <150 MG/DL  
 HDL Cholesterol 36 MG/DL  
 LDL, calculated 54.6 0 - 100 MG/DL  VLDL, calculated 18.4 MG/DL  
 CHOL/HDL Ratio 3.0 0 - 5.0    
CBC W/O DIFF  
 Collection Time: 01/13/19  2:56 AM  
Result Value Ref Range WBC 3.1 (L) 4.1 - 11.1 K/uL  
 RBC 3.36 (L) 4.10 - 5.70 M/uL  
 HGB 11.6 (L) 12.1 - 17.0 g/dL HCT 33.3 (L) 36.6 - 50.3 % MCV 99.1 (H) 80.0 - 99.0 FL  
 MCH 34.5 (H) 26.0 - 34.0 PG  
 MCHC 34.8 30.0 - 36.5 g/dL  
 RDW 13.6 11.5 - 14.5 % PLATELET 97 (L) 454 - 400 K/uL MPV 9.3 8.9 - 12.9 FL  
 NRBC 0.0 0  WBC ABSOLUTE NRBC 0.00 0.00 - 0.01 K/uL Current medications reviewed Sindhu Koo MD

## 2019-01-13 NOTE — PROGRESS NOTES
Problem: Falls - Risk of 
Goal: *Absence of Falls Document Kandis Moulton Fall Risk and appropriate interventions in the flowsheet. Outcome: Progressing Towards Goal 
Fall Risk Interventions: 
  
 
  
 
Medication Interventions: Patient to call before getting OOB, Teach patient to arise slowly Problem: Heart Failure: Day 1 Goal: Medications Outcome: Progressing Towards Goal 
Discussed and reviewed pts concerns regarding medication management of CHF exacerbation with MD at bedside. Educated pt that Amiodarone is being held to prevent a increased drop in HR. Lasix included in plan of care d/t increase in Cr - HCTZ being held as this is a diuretic as well and could cause large drop in BP. Coreg may be restarted depending on HR and BP. Will continue to monitor and educate.

## 2019-01-13 NOTE — PROGRESS NOTES
Problem: Patient Education: Go to Patient Education Activity Goal: Patient/Family Education Outcome: Progressing Towards Goal 
Reviewed teaching- specifically, meds - taking all as directed, diet - maintaining the sodium restriction, activity - resting as needed between activities, taking weight daily - and recording daily. Also reviewed when to call MD, for an increase in shortness of breath, increase in edema and/or increase in weight of 3 pounds in one day or 5 pounds within 5 days, or difficulty sleeping, lying down. Pt expresses a good understanding of information provided.

## 2019-01-14 ENCOUNTER — ANESTHESIA EVENT (OUTPATIENT)
Dept: NON INVASIVE DIAGNOSTICS | Age: 69
DRG: 292 | End: 2019-01-14
Payer: COMMERCIAL

## 2019-01-14 ENCOUNTER — HOME HEALTH ADMISSION (OUTPATIENT)
Dept: HOME HEALTH SERVICES | Facility: HOME HEALTH | Age: 69
End: 2019-01-14

## 2019-01-14 ENCOUNTER — ANESTHESIA (OUTPATIENT)
Dept: NON INVASIVE DIAGNOSTICS | Age: 69
DRG: 292 | End: 2019-01-14
Payer: COMMERCIAL

## 2019-01-14 VITALS
DIASTOLIC BLOOD PRESSURE: 73 MMHG | BODY MASS INDEX: 34.82 KG/M2 | WEIGHT: 243.2 LBS | HEART RATE: 49 BPM | SYSTOLIC BLOOD PRESSURE: 149 MMHG | RESPIRATION RATE: 18 BRPM | HEIGHT: 70 IN | TEMPERATURE: 97.2 F | OXYGEN SATURATION: 98 %

## 2019-01-14 PROBLEM — I50.9 CHF EXACERBATION (HCC): Status: RESOLVED | Noted: 2019-01-12 | Resolved: 2019-01-14

## 2019-01-14 LAB
ANION GAP SERPL CALC-SCNC: 9 MMOL/L (ref 5–15)
ATRIAL RATE: 50 BPM
BASOPHILS # BLD: 0 K/UL (ref 0–0.1)
BASOPHILS NFR BLD: 1 % (ref 0–1)
BUN SERPL-MCNC: 25 MG/DL (ref 6–20)
BUN/CREAT SERPL: 18 (ref 12–20)
CALCIUM SERPL-MCNC: 9.1 MG/DL (ref 8.5–10.1)
CALCULATED P AXIS, ECG09: -2 DEGREES
CALCULATED R AXIS, ECG10: -50 DEGREES
CALCULATED T AXIS, ECG11: 6 DEGREES
CHLORIDE SERPL-SCNC: 100 MMOL/L (ref 97–108)
CO2 SERPL-SCNC: 25 MMOL/L (ref 21–32)
CREAT SERPL-MCNC: 1.38 MG/DL (ref 0.7–1.3)
DIAGNOSIS, 93000: NORMAL
DIFFERENTIAL METHOD BLD: ABNORMAL
EOSINOPHIL # BLD: 0.1 K/UL (ref 0–0.4)
EOSINOPHIL NFR BLD: 4 % (ref 0–7)
ERYTHROCYTE [DISTWIDTH] IN BLOOD BY AUTOMATED COUNT: 14 % (ref 11.5–14.5)
GLUCOSE SERPL-MCNC: 104 MG/DL (ref 65–100)
HCT VFR BLD AUTO: 35 % (ref 36.6–50.3)
HGB BLD-MCNC: 11.9 G/DL (ref 12.1–17)
IMM GRANULOCYTES # BLD AUTO: 0 K/UL (ref 0–0.04)
IMM GRANULOCYTES NFR BLD AUTO: 0 % (ref 0–0.5)
LYMPHOCYTES # BLD: 1.2 K/UL (ref 0.8–3.5)
LYMPHOCYTES NFR BLD: 40 % (ref 12–49)
MCH RBC QN AUTO: 34.3 PG (ref 26–34)
MCHC RBC AUTO-ENTMCNC: 34 G/DL (ref 30–36.5)
MCV RBC AUTO: 100.9 FL (ref 80–99)
MONOCYTES # BLD: 0.6 K/UL (ref 0–1)
MONOCYTES NFR BLD: 19 % (ref 5–13)
NEUTS SEG # BLD: 1.1 K/UL (ref 1.8–8)
NEUTS SEG NFR BLD: 35 % (ref 32–75)
NRBC # BLD: 0 K/UL (ref 0–0.01)
NRBC BLD-RTO: 0 PER 100 WBC
P-R INTERVAL, ECG05: 312 MS
PLATELET # BLD AUTO: 117 K/UL (ref 150–400)
PMV BLD AUTO: 9.1 FL (ref 8.9–12.9)
POTASSIUM SERPL-SCNC: 3.6 MMOL/L (ref 3.5–5.1)
Q-T INTERVAL, ECG07: 532 MS
QRS DURATION, ECG06: 104 MS
QTC CALCULATION (BEZET), ECG08: 485 MS
RBC # BLD AUTO: 3.47 M/UL (ref 4.1–5.7)
SODIUM SERPL-SCNC: 134 MMOL/L (ref 136–145)
VENTRICULAR RATE, ECG03: 50 BPM
WBC # BLD AUTO: 3 K/UL (ref 4.1–11.1)

## 2019-01-14 PROCEDURE — 74011000258 HC RX REV CODE- 258

## 2019-01-14 PROCEDURE — 74011250636 HC RX REV CODE- 250/636

## 2019-01-14 PROCEDURE — 74011250636 HC RX REV CODE- 250/636: Performed by: INTERNAL MEDICINE

## 2019-01-14 PROCEDURE — 92960 CARDIOVERSION ELECTRIC EXT: CPT

## 2019-01-14 PROCEDURE — 85025 COMPLETE CBC W/AUTO DIFF WBC: CPT

## 2019-01-14 PROCEDURE — 74011250637 HC RX REV CODE- 250/637: Performed by: HOSPITALIST

## 2019-01-14 PROCEDURE — 5A2204Z RESTORATION OF CARDIAC RHYTHM, SINGLE: ICD-10-PCS | Performed by: INTERNAL MEDICINE

## 2019-01-14 PROCEDURE — 80048 BASIC METABOLIC PNL TOTAL CA: CPT

## 2019-01-14 PROCEDURE — 93005 ELECTROCARDIOGRAM TRACING: CPT

## 2019-01-14 PROCEDURE — 36415 COLL VENOUS BLD VENIPUNCTURE: CPT

## 2019-01-14 PROCEDURE — 74011250637 HC RX REV CODE- 250/637: Performed by: INTERNAL MEDICINE

## 2019-01-14 RX ORDER — FOLIC ACID 1 MG/1
1 TABLET ORAL DAILY
Qty: 30 TAB | Refills: 0 | Status: SHIPPED | OUTPATIENT
Start: 2019-01-15

## 2019-01-14 RX ORDER — FUROSEMIDE 40 MG/1
40 TABLET ORAL DAILY
Status: DISCONTINUED | OUTPATIENT
Start: 2019-01-14 | End: 2019-01-14 | Stop reason: HOSPADM

## 2019-01-14 RX ORDER — SODIUM CHLORIDE 9 MG/ML
INJECTION, SOLUTION INTRAVENOUS
Status: DISCONTINUED | OUTPATIENT
Start: 2019-01-14 | End: 2019-01-14 | Stop reason: HOSPADM

## 2019-01-14 RX ORDER — FUROSEMIDE 40 MG/1
40 TABLET ORAL DAILY
Qty: 30 TAB | Refills: 0 | Status: SHIPPED | OUTPATIENT
Start: 2019-01-14

## 2019-01-14 RX ORDER — ATROPINE SULFATE 0.1 MG/ML
1 INJECTION INTRAVENOUS AS NEEDED
Status: DISCONTINUED | OUTPATIENT
Start: 2019-01-14 | End: 2019-01-14 | Stop reason: HOSPADM

## 2019-01-14 RX ORDER — SODIUM CHLORIDE 0.9 % (FLUSH) 0.9 %
10 SYRINGE (ML) INJECTION AS NEEDED
Status: DISCONTINUED | OUTPATIENT
Start: 2019-01-14 | End: 2019-01-14 | Stop reason: HOSPADM

## 2019-01-14 RX ORDER — LANOLIN ALCOHOL/MO/W.PET/CERES
100 CREAM (GRAM) TOPICAL DAILY
Qty: 30 TAB | Refills: 0 | Status: SHIPPED | OUTPATIENT
Start: 2019-01-15

## 2019-01-14 RX ORDER — PROPOFOL 10 MG/ML
INJECTION, EMULSION INTRAVENOUS AS NEEDED
Status: DISCONTINUED | OUTPATIENT
Start: 2019-01-14 | End: 2019-01-14 | Stop reason: HOSPADM

## 2019-01-14 RX ADMIN — PROPOFOL 30 MG: 10 INJECTION, EMULSION INTRAVENOUS at 09:50

## 2019-01-14 RX ADMIN — LOSARTAN POTASSIUM 100 MG: 50 TABLET ORAL at 11:14

## 2019-01-14 RX ADMIN — RIVAROXABAN 20 MG: 20 TABLET, FILM COATED ORAL at 11:14

## 2019-01-14 RX ADMIN — PROPOFOL 20 MG: 10 INJECTION, EMULSION INTRAVENOUS at 09:53

## 2019-01-14 RX ADMIN — AMLODIPINE BESYLATE 5 MG: 5 TABLET ORAL at 11:14

## 2019-01-14 RX ADMIN — FOLIC ACID 1 MG: 1 TABLET ORAL at 11:14

## 2019-01-14 RX ADMIN — MULTIPLE VITAMINS W/ MINERALS TAB 1 TABLET: TAB at 11:14

## 2019-01-14 RX ADMIN — Medication 100 MG: at 11:14

## 2019-01-14 RX ADMIN — SODIUM CHLORIDE: 9 INJECTION, SOLUTION INTRAVENOUS at 09:44

## 2019-01-14 RX ADMIN — FUROSEMIDE 40 MG: 10 INJECTION, SOLUTION INTRAMUSCULAR; INTRAVENOUS at 11:15

## 2019-01-14 RX ADMIN — PROPOFOL 20 MG: 10 INJECTION, EMULSION INTRAVENOUS at 09:46

## 2019-01-14 RX ADMIN — PROPOFOL 50 MG: 10 INJECTION, EMULSION INTRAVENOUS at 09:45

## 2019-01-14 NOTE — PROGRESS NOTES
Cardiac Cath Lab Procedure Area Arrival Note: 
 
Vinny Smallwood arrived to Cardiac Cath Lab, Procedure Area. Patient identifiers verified with NAME and DATE OF BIRTH. Procedure verified with patient. Consent forms verified. Allergies verified. Patient informed of procedure and plan of care. Questions answered with review. Patient voiced understanding of procedure and plan of care. Patient on cardiac monitor, non-invasive blood pressure, SPO2 monitor. On O2 @ 2 lpm via nasal cannula. IV of normal saline on pump at 25 ml/hr. Patient status doing well without problems. Patient is A&Ox 4. Patient reports no pain. Patient medicated during procedure with orders obtained and verified by Dr. Lars Finney. Refer to patients Cardiac Cath Lab PROCEDURE REPORT for vital signs, assessment, status, and response during procedure, printed at end of case. Printed report on chart or scanned into chart.

## 2019-01-14 NOTE — PROGRESS NOTES
TRANSFER - IN REPORT: 
 
Verbal report received from [facility] on Blanka Carrillo  being received from cath lab procedure area  for routine progression of care. Report consisted of patients Situation, Background, Assessment and Recommendations(SBAR). Information from the following report(s) Kardex and Procedure Summary was reviewed with the receiving clinician. Opportunity for questions and clarification was provided. Assessment completed upon patients arrival to [address] [place] [address] and care assumed. Cardiac Cath Lab Recovery Arrival Note: 
 
Blanka Carrillo arrived to [facility] recovery area. Patient procedure= CV. Patient on cardiac monitor, non-invasive blood pressure, SPO2 monitor. On RA . IV  of NS on pump at 25 ml/hr. Patient status doing well without problems. Patient is A&Ox 3. Patient reports no pain. PROCEDURE SITE CHECK: 
 
Procedure site:  None No change in patient status. Continue to monitor patient and status.

## 2019-01-14 NOTE — PROGRESS NOTES
Cardiology Progress Note 2019     Admit Date: 2019 Admit Diagnosis: [facility]  CC: none currently Assessment:  
Principal Problem: CHF exacerbation ([facility] (2019) Plan:  
 
Transition to PO lasix after this dose Holding amiodarone, carvedilol given bradycardia Daily lytes DCCV today Continue NOAC Subjective:   
 
Caffie Earnest feels about back to normal 
 
 
Objective:  
 Physical Exam: 
Overall VSSAF;   
Visit Vitals /85 (BP Patient Position: At rest) Pulse (!) 49 Temp 97.7 °F (36.5 °C) Resp 16 Ht 5' 10\" (1.778 m) Wt 110.3 kg (243 lb 3.2 oz) SpO2 98% BMI 34.90 kg/m² Temp (24hrs), Av.3 °F (36.8 °C), Min:97.7 °F (36.5 °C), Max:98.7 °F (37.1 °C) Patient Vitals for the past 8 hrs: 
 Pulse 19 0803 (!) 49  
19 0726 (!) 52  
19 0334 (!) 58 Patient Vitals for the past 8 hrs: 
 Resp  
19 0803 16  
19 0726 18  
19 0334 18 Patient Vitals for the past 8 hrs: 
 BP  
19 0803 127/85  
19 0726 117/81  
19 0334 128/63  
  
 190 -  0700 In: 1280 [P.O.:1280] Out: 3400 [[id number]:[id number]] General Appearance: Well developed, well nourished, no acute distress. Ears/Nose/Mouth/Throat:   Normal MM; anicteric. JVP: WNL Resp:   Improved rales Cardiovascular:  RRR, S1, S2 normal, no new murmur. No gallop or rub. Abdomen:   Soft, non-tender, bowel sounds are present. Extremities: + edema bilaterally. Skin: 
Neuro: Warm and dry. A/O x3, grossly nonfocal  
                     
Data Review:    
Telemetry independently reviewed :   AFIB Labs:  
Recent Results (from the past 24 hour(s)) CBC WITH AUTOMATED DIFF Collection Time: 19  3:39 AM  
Result Value Ref Range WBC 3.0 (L) 4.1 - 11.1 K/uL  
 RBC 3.47 (L) 4.10 - 5.70 M/uL  
 HGB 11.9 (L) 12.1 - 17.0 g/dL HCT 35.0 (L) 36.6 - 50.3 %  .9 (H) 80.0 - 99.0 FL  
 MCH 34.3 (H) 26.0 - 34.0 PG  
 MCHC 34.0 30.0 - 36.5 g/dL  
 RDW 14.0 11.5 - 14.5 % PLATELET 382 (L) 338 - 400 K/uL MPV 9.1 8.9 - 12.9 FL  
 NRBC 0.0 0  WBC ABSOLUTE NRBC 0.00 0.00 - 0.01 K/uL NEUTROPHILS 35 32 - 75 % LYMPHOCYTES 40 12 - 49 % MONOCYTES 19 (H) 5 - 13 % EOSINOPHILS 4 0 - 7 % BASOPHILS 1 0 - 1 % IMMATURE GRANULOCYTES 0 0.0 - 0.5 % ABS. NEUTROPHILS 1.1 (L) 1.8 - 8.0 K/UL  
 ABS. LYMPHOCYTES 1.2 0.8 - 3.5 K/UL  
 ABS. MONOCYTES 0.6 0.0 - 1.0 K/UL  
 ABS. EOSINOPHILS 0.1 0.0 - 0.4 K/UL  
 ABS. BASOPHILS 0.0 0.0 - 0.1 K/UL  
 ABS. IMM. GRANS. 0.0 0.00 - 0.04 K/UL  
 DF AUTOMATED METABOLIC PANEL, BASIC Collection Time: 01/14/19  3:39 AM  
Result Value Ref Range Sodium 134 (L) 136 - 145 mmol/L Potassium 3.6 3.5 - 5.1 mmol/L Chloride 100 97 - 108 mmol/L  
 CO2 25 21 - 32 mmol/L Anion gap 9 5 - 15 mmol/L Glucose 104 (H) 65 - 100 mg/dL BUN 25 (H) 6 - 20 MG/DL Creatinine 1.38 (H) 0.70 - 1.30 MG/DL  
 BUN/Creatinine ratio 18 12 - 20 GFR est AA >60 >60 ml/min/1.73m2 GFR est non-AA 51 (L) >60 ml/min/1.73m2 Calcium 9.1 8.5 - 10.1 MG/DL Current medications reviewed Robert Ramirez MD

## 2019-01-14 NOTE — DISCHARGE INSTRUCTIONS
Discharge Instructions       PATIENT ID: Lela Gayle  MRN: [id number]   YOB: 1950    DATE OF ADMISSION: 1/12/2019 11:35 AM    DATE OF DISCHARGE: 1/14/2019    PRIMARY CARE PROVIDER: Mario Gates MD     ATTENDING PHYSICIAN: Adan Johnson MD  DISCHARGING PROVIDER: Tamir Wells MD    To contact this individual call [phone] and ask the  to page. If unavailable ask to be transferred the Adult Hospitalist Department. DISCHARGE DIAGNOSES   Acute on chronic diastolic CHF exacerbation: cont Lasix. Stop Hydrochlothiazide  afib with bradycardia s/p cardioversion: currently in NSR  Dr. Jodi Nascimento recommends to NOT TAKE coreg and amiodarone. He also recommends to hold on those medication incase you need to resume them    CONSULTATIONS: [address] TO HOSPITALIST    PROCEDURES/SURGERIES: SALVADOR and cardioversion    PENDING TEST RESULTS:   At the time of discharge the following test results are still pending: n/a    FOLLOW UP APPOINTMENTS:   Follow-up Information     Follow up With Specialties Details Why Contact Info    Other, MD Ash  Schedule an appointment as soon as possible for a visit for post-hospitalization follow up, with in 7 days Patient can only remember the practice name and not the physician      Gurmeet Oneal MD Cardiology Schedule an appointment as soon as possible for a visit in 2 weeks for follow Up [address] [place] [address] [address] [facility]  [phone]             ADDITIONAL CARE RECOMMENDATIONS: \  · It is important that you take the medication exactly as they are prescribed. · Keep your medication in the bottles provided by the pharmacist and keep a list of the medication names, dosages, and times to be taken in your wallet. · Do not take other medications without consulting your doctor. · No drinking alcohol or driving car or operating machinery if you are on narcotic pain medications.  Donot take sedating mediations if you are sleepy or confused. · Fall Precautions  · Keep Well Hydrated  · Report to your medical provider if you feel you have  developed allergies to medications  · Follow up with your PCP or Consultant for medication adjustments and refills  · Monitor for signs of fevers,chills,bleeding,chest pain and seek medical attention if you do so. · Tell your doctor all the prescription, herbal, or over-the-counter medicines you take. Do not take any new ones unless you talk to your doctor first.  · Do not take anti-inflammatory medicines. These include ibuprofen (Advil, Motrin) and naproxen (Aleve). You can use acetaminophen (Tylenol) for pain. · Do not take two or more pain medicines at the same time unless the doctor told you to. Many pain medicines have acetaminophen, which is Tylenol. Too much acetaminophen (Tylenol) can be harmful. · Tell all doctors and others who work with your health care that you have kidney disease. · Wear medical alert jewelry that lists your health problem. You can buy this at most drugstores. DIET: DIET CARDIAC    ACTIVITY: Activity as tolerated and no driving for today  Anticoagulation precautions    WOUND CARE: n/a    EQUIPMENT needed: n/a      DISCHARGE MEDICATIONS:   See Medication Reconciliation Form    · It is important that you take the medication exactly as they are prescribed. · Keep your medication in the bottles provided by the pharmacist and keep a list of the medication names, dosages, and times to be taken in your wallet. · Do not take other medications without consulting your doctor. NOTIFY YOUR PHYSICIAN FOR ANY OF THE FOLLOWING:   Fever over 101 degrees for 24 hours. Chest pain, shortness of breath, fever, chills, nausea, vomiting, diarrhea, change in mentation, falling, weakness, bleeding. Severe pain or pain not relieved by medications. Or, any other signs or symptoms that you may have questions about.       DISPOSITION:  x  Home With:   OT  PT    RN       SNF/Inpatient Rehab/LTAC    Independent/assisted living    Hospice    Other:         Signed:   Luis Felipe Hsieh MD  1/14/2019  3:06 PM

## 2019-01-14 NOTE — PROGRESS NOTES
Cardiac Cath Lab Recovery Arrival Note: 
 
 
Brown Born arrived to Cardiac Cath Lab, Recovery Area. Staff introduced to patient. Patient identifiers verified with NAME and DATE OF BIRTH. Procedure verified with patient. Consent forms reviewed and signed by patient or authorized representative and verified. Allergies verified. Patient and family oriented to department. Patient and family informed of procedure and plan of care. Questions answered with review. Patient prepped for procedure, per orders from physician, prior to arrival. 
 
Patient on cardiac monitor, non-invasive blood pressure, SPO2 monitor. On RA. Patient is A&Ox 4. Patient reports no pain. Patient in stretcher, in low position, with side rails up, call bell within reach, patient instructed to call if assistance as needed. Patient prep in: [address], [place] [address]. Patient family has pager # Family in:. Prep by: JUAN CARLOS Verduzco RN

## 2019-01-14 NOTE — NURSE NAVIGATOR
Chart reviewed by Heart Failure Nurse Navigator. Heart Failure database completed. EF:  Echo pending ACEi/ARB/ARNi: losartan 100 mg daily BB: coreg 25 mg twice daily Aldosterone Antagonist: ** 
 
CRT : not indicated NYHA Functional Class III, on admission. Heart Failure Teach Back in Patient Education. Heart Failure Avoiding Triggers on Discharge Instructions. Cardiologist: Dr. Tamika Dickson ([facility]) - patient's primary cardiologist is in [place] where he lives, he is staying at [facility] for work until April. Post discharge follow up phone call to be made within 48-72 hours of discharge.

## 2019-01-14 NOTE — PROGRESS NOTES
Problem: Heart Failure: Day 1 Goal: Medications Outcome: Progressing Towards Goal 
Pt on Amlodipine, Losartan, Xarelto, Lasix. Monitoring strict I's and O's. Pt on 1500 ml FR.

## 2019-01-14 NOTE — PROGRESS NOTES
TRANSFER - OUT REPORT: 
 
Verbal report given to Heladio James RN on Lela Gayle being transferred to [facility]  for routine progression of care Report consisted of patients Situation, Background, Assessment and  
Recommendations(SBAR). Information from the following report(s) Kardex and Procedure Summary was reviewed with the receiving nurse. Opportunity for questions and clarification was provided.

## 2019-01-14 NOTE — PROCEDURES
Pt transported to procedure room after consent signed. Anesthesia via anesthesia team.    Successful external synchronized cardioversion with x1 360 J shock after x2 unsuccessful 200 J shocks. Pt transported back to recovery room without complication.

## 2019-01-14 NOTE — CARDIO/PULMONARY
Cardiac Rehab: Living with Heart Failure Booklet given to Kali Tinsley. Met with the pt to reinforce HF education. Educated using teach back method. Discussed diagnosis definition and assessed patient understanding. Reviewed importance of daily weight monitoring and Low Sodium diet (9380-9154 mg. Daily). Kali Tinsley travels for his work and eats out regularly with high sodium intake. He likes salt. Introduced the [employer]ENAIDA. for his smart phone to better track sodium intake. He does not know when he goes into atrial fib. He does not check his weight daily. Encouraged activity and rest periods within symptom limitations and as ordered by physician. Reviewed coreg, purpose of medication, potential side effects, compliance, and what to do if dose is missed. Discussed importance of reporting signs and symptoms of exacerbation, and when to report them to the doctor, to prevent re-hospitalization. Kali Tinsley was encouraged to keep all appointments with doctor. Smoking history assessed. Patient is a non smoker. Stressed the importance of reading food labels.  
Sonal Acevedo RN

## 2019-01-14 NOTE — DISCHARGE SUMMARY
Inpatient hospitalist discharge summary                Brief Overview    PATIENT ID: Kali Tinsley    MRN: [id number]     YOB: 1950    Admitting Provider: Taryn Dong MD    Discharging Provider: Timoteo Conley MD   To contact this individual call [phone] and ask the  to page. If unavailable ask to be transferred the Adult Hospitalist Department. PCP at discharge: Ash Kaur MD None   Patient can only remember the practice name and not the phy*    Admission date: 1/12/2019  Date of Discharge: 01/14/19    Chief complaint:   Chief Complaint   Patient presents with    Palpitations    Shortness of Breath     Patient Active Problem List   Diagnosis Code   (none) - all problems resolved or deleted         Primary discharge diagnosis:  Acute on chronic diastolic CHF exacerbation, POA, NYHA 2-3, resolved    Secondary discharge diagnosis:  Chronic Atrial fibrillation with bradycardia: S/P cardioversion with return to NSR. Cont to home BB and Amiodarone as per Cards. Hypertension    Obesity. Body mass index is 34.9 kg/m². Alcohol abuse, cessation strongly encouraged. Mild Hyponatremia due to HCTZ use with possible beer potomania. Discharge Disposition:    Home. Discharge activity:  Activity as tolerated and no driving for today    Code status at discharge:  Full Code     Active issues requiring follow up: Afib  CHF    Outpatient follow up:  FU with cards as recommended      Future appointments-  No future appointments.   Follow-up Information     Follow up With Specialties Details Why Contact Info    Ash Kaur MD  Schedule an appointment as soon as possible for a visit for post-hospitalization follow up, with in 7 days Patient can only remember the practice name and not the physician      Abby Randolph MD Cardiology Schedule an appointment as soon as possible for a visit in 2 weeks for follow Up [facility]  [address] [facility],[id number]  [phone]            Test results pending upon discharge:  Echo          Details of hospital stay      Presenting problem/ History of present illness:  CHF exacerbation ([address]    Ramandeep Mcneil is a 70 y.o. male  Presented with dyspnea, noted to have slow a-fib with acute CHF. Hospital Course:    - CHF exacerbation: acute on chronic diastolic HF. Cont lasix daily  continue losartan. Hold BB and amiodarone now due to significant bradycardia. Dr. Elvis Hammond recommends to NOT TAKE coreg and amiodarone. He also recommends to hold on those medication incase you need to resume them  DW Cards     - Chronic afib with bradycardia. s/p cardioversion 1/14  Cont Xarelto  Currently in NSR     - HTN:ontinue ARB   Stop HCTZ     - Obese: BMI 34.   Weight loss recommended     - Leukopenia: , etiology unknown, possibly Due to prolong alcohol abuse. Monitor cbc     - Alcohol abuse: daily beer  Cessation advised  Cont MVI, folic acid, thiamine     - Mild hyponatremia: may due to HCTZ and beer      - Mild elevation of TSH  No clinically significant  Repeat TSH after acute issues resolved, likely as outpatient     On the date of discharge, diagnostic face to face encounter was performed. Patient was hemodynamically stable, offering no new complaints. Denies any shortness of breath at rest, no fevers or chills, no diarrhea or constipation. Patient is agreeable for discharge. Patient understood and verbalized the understanding of the discharge plan. Patient was advised to seek medical help/ care or return to ED, if symptoms recur, worsen or new symptoms develop. Operative procedures performed:      Treatments: cardioversion    Consults:  IP CONSULT TO CARDIOLOGY  IP CONSULT TO HOSPITALIST    Diet:  DIET CARDIAC  Pertinent test results:  Xr Chest Port    Result Date: 1/12/2019  Chest portable AP History: Dyspnea on exertion Comparison: None Findings: The lungs are well expanded.   No focal consolidation, pleural effusion, or pneumothorax. The heart is moderately enlarged. There is mild edema. Degenerative changes are seen in the shoulders. Impression: Moderate cardiomegaly with mild edema      Recent Results (from the past 336 hour(s))   EKG, 12 LEAD, INITIAL    Collection Time: 01/12/19 11:31 AM   Result Value Ref Range    Ventricular Rate 50 BPM    Atrial Rate 47 BPM    QRS Duration 96 ms    Q-T Interval 512 ms    QTC Calculation (Bezet) 466 ms    Calculated R Axis -53 degrees    Calculated T Axis 10 degrees    Diagnosis       Atrial fibrillation  Left axis deviation  Inferior infarct , age undetermined  No previous ECGs available  Confirmed by Fer Alfredo MD ([id number]) on 1/13/2019 1:31:50 PM     CBC WITH AUTOMATED DIFF    Collection Time: 01/12/19 11:55 AM   Result Value Ref Range    WBC 2.1 (L) 4.1 - 11.1 K/uL    RBC 3.46 (L) 4.10 - 5.70 M/uL    HGB 11.9 (L) 12.1 - 17.0 g/dL    HCT 34.4 (L) 36.6 - 50.3 %    MCV 99.4 (H) 80.0 - 99.0 FL    MCH 34.4 (H) 26.0 - 34.0 PG    MCHC 34.6 30.0 - 36.5 g/dL    RDW 13.8 11.5 - 14.5 %    PLATELET 95 (L) 446 - 400 K/uL    MPV 9.1 8.9 - 12.9 FL    NRBC 0.0 0  WBC    ABSOLUTE NRBC 0.00 0.00 - 0.01 K/uL    NEUTROPHILS 33 32 - 75 %    LYMPHOCYTES 38 12 - 49 %    MONOCYTES 23 (H) 5 - 13 %    EOSINOPHILS 4 0 - 7 %    BASOPHILS 1 0 - 1 %    IMMATURE GRANULOCYTES 1 (H) 0.0 - 0.5 %    ABS. NEUTROPHILS 0.7 (L) 1.8 - 8.0 K/UL    ABS. LYMPHOCYTES 0.8 0.8 - 3.5 K/UL    ABS. MONOCYTES 0.5 0.0 - 1.0 K/UL    ABS. EOSINOPHILS 0.1 0.0 - 0.4 K/UL    ABS. BASOPHILS 0.0 0.0 - 0.1 K/UL    ABS. IMM.  GRANS. 0.0 0.00 - 0.04 K/UL    DF SMEAR SCANNED      RBC COMMENTS NORMOCYTIC, NORMOCHROMIC      Pathologist review Pathology Review Requested     METABOLIC PANEL, COMPREHENSIVE    Collection Time: 01/12/19 11:55 AM   Result Value Ref Range    Sodium 132 (L) 136 - 145 mmol/L    Potassium 3.8 3.5 - 5.1 mmol/L    Chloride 99 97 - 108 mmol/L    CO2 25 21 - 32 mmol/L    Anion gap 8 5 - 15 mmol/L Glucose 100 65 - 100 mg/dL    BUN 23 (H) 6 - 20 MG/DL    Creatinine 1.26 0.70 - 1.30 MG/DL    BUN/Creatinine ratio 18 12 - 20      GFR est AA >60 >60 ml/min/1.73m2    GFR est non-AA 57 (L) >60 ml/min/1.73m2    Calcium 8.5 8.5 - 10.1 MG/DL    Bilirubin, total 0.7 0.2 - 1.0 MG/DL    ALT (SGPT) 30 12 - 78 U/L    AST (SGOT) 23 15 - 37 U/L    Alk. phosphatase 62 45 - 117 U/L    Protein, total 7.6 6.4 - 8.2 g/dL    Albumin 3.5 3.5 - 5.0 g/dL    Globulin 4.1 (H) 2.0 - 4.0 g/dL    A-G Ratio 0.9 (L) 1.1 - 2.2     NT-PRO BNP    Collection Time: 01/12/19 11:55 AM   Result Value Ref Range    NT pro-BNP 1,189 (H) 0 - 125 PG/ML   MAGNESIUM    Collection Time: 01/12/19 11:55 AM   Result Value Ref Range    Magnesium 1.9 1.6 - 2.4 mg/dL   TROPONIN I    Collection Time: 01/12/19 11:55 AM   Result Value Ref Range    Troponin-I, Qt. <0.05 <0.05 ng/mL   SAMPLES BEING HELD    Collection Time: 01/12/19 11:56 AM   Result Value Ref Range    SAMPLES BEING HELD 1RED,1BLUE     COMMENT        Add-on orders for these samples will be processed based on acceptable specimen integrity and analyte stability, which may vary by analyte. METABOLIC PANEL, COMPREHENSIVE    Collection Time: 01/13/19  2:56 AM   Result Value Ref Range    Sodium 133 (L) 136 - 145 mmol/L    Potassium 3.7 3.5 - 5.1 mmol/L    Chloride 99 97 - 108 mmol/L    CO2 25 21 - 32 mmol/L    Anion gap 9 5 - 15 mmol/L    Glucose 92 65 - 100 mg/dL    BUN 27 (H) 6 - 20 MG/DL    Creatinine 1.36 (H) 0.70 - 1.30 MG/DL    BUN/Creatinine ratio 20 12 - 20      GFR est AA >60 >60 ml/min/1.73m2    GFR est non-AA 52 (L) >60 ml/min/1.73m2    Calcium 8.6 8.5 - 10.1 MG/DL    Bilirubin, total 0.7 0.2 - 1.0 MG/DL    ALT (SGPT) 27 12 - 78 U/L    AST (SGOT) 23 15 - 37 U/L    Alk.  phosphatase 59 45 - 117 U/L    Protein, total 7.3 6.4 - 8.2 g/dL    Albumin 3.2 (L) 3.5 - 5.0 g/dL    Globulin 4.1 (H) 2.0 - 4.0 g/dL    A-G Ratio 0.8 (L) 1.1 - 2.2     MAGNESIUM    Collection Time: 01/13/19  2:56 AM   Result Value Ref Range    Magnesium 1.9 1.6 - 2.4 mg/dL   PHOSPHORUS    Collection Time: 01/13/19  2:56 AM   Result Value Ref Range    Phosphorus 3.8 2.6 - 4.7 MG/DL   TSH 3RD GENERATION    Collection Time: 01/13/19  2:56 AM   Result Value Ref Range    TSH 4.14 (H) 0.36 - 3.74 uIU/mL   LIPID PANEL    Collection Time: 01/13/19  2:56 AM   Result Value Ref Range    LIPID PROFILE          Cholesterol, total 109 <200 MG/DL    Triglyceride 92 <150 MG/DL    HDL Cholesterol 36 MG/DL    LDL, calculated 54.6 0 - 100 MG/DL    VLDL, calculated 18.4 MG/DL    CHOL/HDL Ratio 3.0 0 - 5.0     CBC W/O DIFF    Collection Time: 01/13/19  2:56 AM   Result Value Ref Range    WBC 3.1 (L) 4.1 - 11.1 K/uL    RBC 3.36 (L) 4.10 - 5.70 M/uL    HGB 11.6 (L) 12.1 - 17.0 g/dL    HCT 33.3 (L) 36.6 - 50.3 %    MCV 99.1 (H) 80.0 - 99.0 FL    MCH 34.5 (H) 26.0 - 34.0 PG    MCHC 34.8 30.0 - 36.5 g/dL    RDW 13.6 11.5 - 14.5 %    PLATELET 97 (L) 564 - 400 K/uL    MPV 9.3 8.9 - 12.9 FL    NRBC 0.0 0  WBC    ABSOLUTE NRBC 0.00 0.00 - 0.01 K/uL   CBC WITH AUTOMATED DIFF    Collection Time: 01/14/19  3:39 AM   Result Value Ref Range    WBC 3.0 (L) 4.1 - 11.1 K/uL    RBC 3.47 (L) 4.10 - 5.70 M/uL    HGB 11.9 (L) 12.1 - 17.0 g/dL    HCT 35.0 (L) 36.6 - 50.3 %    .9 (H) 80.0 - 99.0 FL    MCH 34.3 (H) 26.0 - 34.0 PG    MCHC 34.0 30.0 - 36.5 g/dL    RDW 14.0 11.5 - 14.5 %    PLATELET 810 (L) 099 - 400 K/uL    MPV 9.1 8.9 - 12.9 FL    NRBC 0.0 0  WBC    ABSOLUTE NRBC 0.00 0.00 - 0.01 K/uL    NEUTROPHILS 35 32 - 75 %    LYMPHOCYTES 40 12 - 49 %    MONOCYTES 19 (H) 5 - 13 %    EOSINOPHILS 4 0 - 7 %    BASOPHILS 1 0 - 1 %    IMMATURE GRANULOCYTES 0 0.0 - 0.5 %    ABS. NEUTROPHILS 1.1 (L) 1.8 - 8.0 K/UL    ABS. LYMPHOCYTES 1.2 0.8 - 3.5 K/UL    ABS. MONOCYTES 0.6 0.0 - 1.0 K/UL    ABS. EOSINOPHILS 0.1 0.0 - 0.4 K/UL    ABS. BASOPHILS 0.0 0.0 - 0.1 K/UL    ABS. IMM.  GRANS. 0.0 0.00 - 0.04 K/UL    DF AUTOMATED     METABOLIC PANEL, BASIC    Collection Time: 01/14/19  3:39 AM   Result Value Ref Range    Sodium 134 (L) 136 - 145 mmol/L    Potassium 3.6 3.5 - 5.1 mmol/L    Chloride 100 97 - 108 mmol/L    CO2 25 21 - 32 mmol/L    Anion gap 9 5 - 15 mmol/L    Glucose 104 (H) 65 - 100 mg/dL    BUN 25 (H) 6 - 20 MG/DL    Creatinine 1.38 (H) 0.70 - 1.30 MG/DL    BUN/Creatinine ratio 18 12 - 20      GFR est AA >60 >60 ml/min/1.73m2    GFR est non-AA 51 (L) >60 ml/min/1.73m2    Calcium 9.1 8.5 - 10.1 MG/DL   EKG, 12 LEAD, INITIAL    Collection Time: 01/14/19 10:29 AM   Result Value Ref Range    Ventricular Rate 50 BPM    Atrial Rate 50 BPM    P-R Interval 312 ms    QRS Duration 104 ms    Q-T Interval 532 ms    QTC Calculation (Bezet) 485 ms    Calculated P Axis -2 degrees    Calculated R Axis -50 degrees    Calculated T Axis 6 degrees    Diagnosis       Sinus bradycardia with 1st degree AV block  Left anterior fascicular block  When compared with ECG of 12-JAN-2019 11:31,  Sinus rhythm has replaced Atrial fibrillation  Confirmed by Colt Case MD, Theodore Son ([id number]) on 1/14/2019 11:50:07 AM             Physical Exam on Discharge:    Discharge condition: fair    Vital signs:   Patient Vitals for the past 12 hrs:   Temp Pulse Resp BP SpO2   01/14/19 1105 97.2 °F (36.2 °C) (!) 49 18 149/73 98 %   01/14/19 1045  (!) 54  128/77 98 %   01/14/19 1030  (!) 50  139/72 97 %   01/14/19 1015  (!) 51  102/68 96 %   01/14/19 1002  (!) 52 16 113/69 99 %   01/14/19 0954  (!) 49 12 116/65 98 %   01/14/19 0803  (!) 49 16 127/85 98 %   01/14/19 0726 97.7 °F (36.5 °C) (!) 52 18 117/81 100 %   01/14/19 0334 98.1 °F (36.7 °C) (!) 58 18 128/63 100 %       General appearance: alert, cooperative, no distress, appears stated age  Lungs: clear to auscultation bilaterally  Heart: regular rate and rhythm    Current Discharge Medication List      START taking these medications    Details   folic acid (FOLVITE) 1 mg tablet Take 1 Tab by mouth daily.   Qty: 30 Tab, Refills: 0      furosemide (LASIX) 40 mg tablet Take 1 Tab by mouth daily. Qty: 30 Tab, Refills: 0      multivitamin, tx-iron-ca-min (THERA-M W/ IRON) 9 mg iron-400 mcg tab tablet Take 1 Tab by mouth daily. Qty: 30 Tab, Refills: 0      thiamine HCL (B-1) 100 mg tablet Take 1 Tab by mouth daily. Qty: 30 Tab, Refills: 0         CONTINUE these medications which have NOT CHANGED    Details   amLODIPine (NORVASC) 5 mg tablet Take 5 mg by mouth daily. losartan (COZAAR) 100 mg tablet Take 100 mg by mouth daily. potassium chloride SR (KLOR-CON 10) 10 mEq tablet Take 10 mEq by mouth daily. rivaroxaban (XARELTO) 20 mg tab tablet Take 20 mg by mouth daily (with breakfast). STOP taking these medications       amiodarone (CORDARONE) 200 mg tablet Comments:   Reason for Stopping:         carvedilol (COREG) 25 mg tablet Comments:   Reason for Stopping:         hydroCHLOROthiazide (HYDRODIURIL) 25 mg tablet Comments:   Reason for Stopping: Total time spent on discharge planning, counseling and co-ordination of care:   25 minutes    Hussein Pineda MD  01/14/19  3:08 PM        NOTIFY YOUR PHYSICIAN FOR ANY OF THE FOLLOWING:   Fever over 101 degrees for 24 hours. Chest pain, shortness of breath, fever, chills, nausea, vomiting, diarrhea, change in mentation, falling, weakness, bleeding. Severe pain or pain not relieved by medications. Or, any other signs or symptoms that you may have questions about.

## 2019-01-14 NOTE — ROUTINE PROCESS
Bedside shift change report given to Bertha Nice RN (oncoming nurse) by Lei Nunn RN (offgoing nurse). Report included the following information SBAR, Kardex, ED Summary, Intake/Output, MAR, Accordion, Recent Results and Cardiac Rhythm A-Fib/Flutter.

## 2019-01-14 NOTE — PROGRESS NOTES
Successful DCCV of AF to NSR. Can stop IV lasix and have transitioned to PO. Can continue current meds and DC with same. No further cardiac interventions planned. Will arrange for follow up for pt with me in office. Please call with questions

## 2019-01-14 NOTE — ANESTHESIA PREPROCEDURE EVALUATION
Anesthetic History No history of anesthetic complications Review of Systems / Medical History Patient summary reviewed, nursing notes reviewed and pertinent labs reviewed Pulmonary Asthma Neuro/Psych Within defined limits Cardiovascular Hypertension Dysrhythmias : atrial fibrillation Exercise tolerance: >4 METS 
  
GI/Hepatic/Renal 
  
GERD: well controlled Endo/Other Within defined limits Other Findings Physical Exam 
 
Airway Mallampati: II 
TM Distance: > 6 cm Neck ROM: normal range of motion Mouth opening: Normal 
 
 Cardiovascular Regular rate and rhythm,  S1 and S2 normal,  no murmur, click, rub, or gallop Dental 
 
Dentition: Upper partial plate and Lower partial plate Pulmonary Breath sounds clear to auscultation Abdominal 
GI exam deferred Other Findings Anesthetic Plan ASA: 3 Anesthesia type: MAC Induction: Intravenous Anesthetic plan and risks discussed with: Patient

## 2019-01-15 NOTE — ANESTHESIA POSTPROCEDURE EVALUATION
Post-Anesthesia Evaluation and Assessment Patient: Becca Salas MRN: [id number]  SSN: [id number] YOB: 1950  Age: 70 y.o. Sex: male I have evaluated the patient and they are stable and ready for discharge from the PACU. Cardiovascular Function/Vital Signs Visit Vitals /73 (BP 1 Location: Left arm, BP Patient Position: Sitting) Pulse (!) 49 Temp 36.2 °C (97.2 °F) Resp 18 Ht 5' 10\" (1.778 m) Wt 110.3 kg (243 lb 3.2 oz) SpO2 98% BMI 34.90 kg/m² Patient is status post * No anesthesia type entered * anesthesia for * No procedures listed *. Nausea/Vomiting: None Postoperative hydration reviewed and adequate. Pain: 
Pain Scale 1: Numeric (0 - 10) (01/14/19 1105) Pain Intensity 1: 0 (01/14/19 1105) Managed Neurological Status: At baseline Mental Status, Level of Consciousness: Alert and  oriented to person, place, and time Pulmonary Status:  
O2 Device: Room air (01/14/19 1105) Adequate oxygenation and airway patent Complications related to anesthesia: None Post-anesthesia assessment completed. No concerns Signed By: Gracie Owen MD   
 January 14, 2019 * No procedures listed *. 
 
<BSHSIANPOST> Visit Vitals /73 (BP 1 Location: Left arm, BP Patient Position: Sitting) Pulse (!) 49 Temp 36.2 °C (97.2 °F) Resp 18 Ht 5' 10\" (1.778 m) Wt 110.3 kg (243 lb 3.2 oz) SpO2 98% BMI 34.90 kg/m²

## 2019-01-16 ENCOUNTER — HOME CARE VISIT (OUTPATIENT)
Dept: HOME HEALTH SERVICES | Facility: HOME HEALTH | Age: 69
End: 2019-01-16

## 2019-01-17 ENCOUNTER — HOME CARE VISIT (OUTPATIENT)
Dept: HOME HEALTH SERVICES | Facility: HOME HEALTH | Age: 69
End: 2019-01-17

## 2019-01-19 ENCOUNTER — HOME CARE VISIT (OUTPATIENT)
Dept: SCHEDULING | Facility: HOME HEALTH | Age: 69
End: 2019-01-19

## 2019-01-19 PROCEDURE — G0299 HHS/HOSPICE OF RN EA 15 MIN: HCPCS

## 2019-02-03 ENCOUNTER — APPOINTMENT (OUTPATIENT)
Dept: GENERAL RADIOLOGY | Age: 69
End: 2019-02-03
Attending: EMERGENCY MEDICINE
Payer: MEDICARE

## 2019-02-03 ENCOUNTER — HOSPITAL ENCOUNTER (EMERGENCY)
Age: 69
Discharge: HOME | End: 2019-02-04
Attending: EMERGENCY MEDICINE
Payer: MEDICARE

## 2019-02-03 DIAGNOSIS — M25.571 ACUTE RIGHT ANKLE PAIN: Primary | ICD-10-CM

## 2019-02-03 PROCEDURE — 73610 X-RAY EXAM OF ANKLE: CPT

## 2019-02-03 PROCEDURE — 99284 EMERGENCY DEPT VISIT MOD MDM: CPT

## 2019-02-03 PROCEDURE — 99283 EMERGENCY DEPT VISIT LOW MDM: CPT

## 2019-02-04 VITALS — SYSTOLIC BLOOD PRESSURE: 122 MMHG | DIASTOLIC BLOOD PRESSURE: 85 MMHG | HEART RATE: 68 BPM | OXYGEN SATURATION: 95 %

## 2019-02-04 LAB — D DIMER PPP FEU-MCNC: 0.28 MG/L FEU (ref 0–0.65)

## 2019-02-04 PROCEDURE — 36415 COLL VENOUS BLD VENIPUNCTURE: CPT

## 2019-02-04 PROCEDURE — 85379 FIBRIN DEGRADATION QUANT: CPT

## 2019-02-04 PROCEDURE — 74011250637 HC RX REV CODE- 250/637: Performed by: EMERGENCY MEDICINE

## 2019-02-04 RX ORDER — ACETAMINOPHEN 500 MG
1000 TABLET ORAL
Status: COMPLETED | OUTPATIENT
Start: 2019-02-04 | End: 2019-02-04

## 2019-02-04 RX ORDER — LIDOCAINE 50 MG/G
PATCH TOPICAL
Qty: 15 EACH | Refills: 0 | Status: SHIPPED | OUTPATIENT
Start: 2019-02-04

## 2019-02-04 RX ADMIN — ACETAMINOPHEN 1000 MG: 500 TABLET ORAL at 00:16

## 2019-02-04 NOTE — ED PROVIDER NOTES
70 y.o. male with past medical history significant for Afib, HTN, and asthma presents to the ED ambulatory with chief complaint of lateral right ankle pain and swelling, onset approximately 4 hours PTA. Pt explains that he was washing his truck and grocery shopping today; pt denies any known triggers. He reports decreased range of motion secondary to pain and swelling. Pt indicates that he is on Xarelto and Lasix; he also indicates that he had his heart \"fixed\" here 1 month ago. He denies any trauma to the ankle. Pt denies h/o gout. Pt denies having taken any medication for pain management. Pt denies any fevers, chills, or shortness of breath. There are no other acute medical concerns at this time. Social hx: Patient denies Tobacco use. Reports 3.6 oz/week of EtOH use. Denies illicit drug abuse. PCP: Vincent, MD Ash 
 
Note written by Jose Daniel Rios, as dictated by Jia Silva MD 12:00 AM 
 
 
 
The history is provided by the patient. No  was used. Past Medical History:  
Diagnosis Date  Asthma  Atrial fibrillation and flutter ([facility] [address]  Hypertension No past surgical history on file. No family history on file. Social History Socioeconomic History  Marital status:  Spouse name: Not on file  Number of children: Not on file  Years of education: Not on file  Highest education level: Not on file Social Needs  Financial resource strain: Not on file  Food insecurity - worry: Not on file  Food insecurity - inability: Not on file  Transportation needs - medical: Not on file  Transportation needs - non-medical: Not on file Occupational History  Not on file Tobacco Use  Smoking status: Never Smoker  Smokeless tobacco: Never Used Substance and Sexual Activity  Alcohol use: Yes Alcohol/week: 3.6 oz Types: 6 Cans of beer per week Frequency: Never  Drug use:  No  
  Sexual activity: Not on file Other Topics Concern  Not on file Social History Narrative  Not on file ALLERGIES: Patient has no known allergies. Review of Systems Constitutional: Negative for chills and fever. Respiratory: Negative for shortness of breath. Cardiovascular: Positive for leg swelling (bilateral). Negative for chest pain. Gastrointestinal: Negative for abdominal pain, constipation, diarrhea and vomiting. Musculoskeletal: Positive for arthralgias (right ankle), gait problem and joint swelling (bilateral ankles). Neurological: Negative for dizziness and light-headedness. All other systems reviewed and are negative. Vitals:  
 02/03/19 2257 Pulse: 71 SpO2: 98% Physical Exam  
Constitutional: He appears well-developed and well-nourished. No distress. HENT:  
Head: Normocephalic and atraumatic. Eyes: Conjunctivae are normal. Pupils are equal, round, and reactive to light. Neck: Normal range of motion. Cardiovascular: Normal rate and regular rhythm. Pt has 1+ pitting edema to BLE. Pulmonary/Chest: Effort normal and breath sounds normal.  
Abdominal: Soft. He exhibits no distension. There is no tenderness. Musculoskeletal: Normal range of motion. Pt is tender to the lateral aspect of his right ankle. Neurological: He is alert. Skin: Skin is dry. Capillary refill takes less than 2 seconds. Nursing note and vitals reviewed. Note written by [place], as dictated by Nereyda Kinney MD 12:00 AM 
 
MDM Number of Diagnoses or Management Options Acute right ankle pain:  
Diagnosis management comments: Pt presents with extremity pain. No evidence of fracture, gout, cellulitis, DVT, hematoma, dislocation, or other significant musculoskeletal injury.  Patient was discharged home with a plan for pain control as well as instructions on managing his injuries and precautions for returning to the emergency department. No evidence of compartment syndrome on evaluation. Patient will be discharged home to follow-up with primary care provider as instructed in discharge paperwork. Patient and family expressed understanding and agreed with plan. Procedures 1:04 AM 
The patient has been updated on results, and has been planned for discharge. Instructed the patient to follow-up with PCP, and to return to the ED should any new symptoms arise or worsen. Patient agreeable to plan. 
 
1:04 AM 
Patient's results have been reviewed with them. Patient and/or family have verbally conveyed their understanding and agreement of the patient's signs, symptoms, diagnosis, treatment and prognosis and additionally agree to follow up as recommended or return to the Emergency Room should their condition change prior to follow-up. Discharge instructions have also been provided to the patient with some educational information regarding their diagnosis as well a list of reasons why they would want to return to the ER prior to their follow-up appointment should their condition change.